# Patient Record
Sex: MALE | Race: WHITE
[De-identification: names, ages, dates, MRNs, and addresses within clinical notes are randomized per-mention and may not be internally consistent; named-entity substitution may affect disease eponyms.]

---

## 2019-03-08 ENCOUNTER — HOSPITAL ENCOUNTER (OUTPATIENT)
Dept: HOSPITAL 62 - RAD | Age: 44
End: 2019-03-08
Attending: NURSE PRACTITIONER
Payer: OTHER GOVERNMENT

## 2019-03-08 DIAGNOSIS — R31.0: ICD-10-CM

## 2019-03-08 DIAGNOSIS — N20.0: Primary | ICD-10-CM

## 2019-03-08 PROCEDURE — 74178 CT ABD&PLV WO CNTR FLWD CNTR: CPT

## 2019-03-08 PROCEDURE — 82565 ASSAY OF CREATININE: CPT

## 2019-03-08 NOTE — RADIOLOGY REPORT (SQ)
EXAM DESCRIPTION:  CT ABD/PELVIS COMBO



COMPLETED DATE/TIME:  3/8/2019 3:26 pm



REASON FOR STUDY:  GROSS HEMATURIA R31.0  GROSS HEMATURIA



COMPARISON:  None.



TECHNIQUE:  CT scan of the abdomen and pelvis performed with and without intravenous contrast, and wi
thout oral contrast. Contrasted imaging performed helical scanning technique and dynamic intravenous 
contrast injection. Images reviewed with lung, soft tissue, and bone windows. Reconstructed coronal a
nd sagittal MPR images reviewed. Delayed images for evaluation of the urinary system also acquired. A
ll images stored on PACS.

All CT scanners at this facility use dose modulation, iterative reconstruction, and/or weight based d
osing when appropriate to reduce radiation dose to as low as reasonably achievable (ALARA).

[facility]: Dose Right  [facility]: CareDose    [facility]: Dose Right    [facility]: Teradose 4D    [facility]: Smart Technologies



CONTRAST TYPE AND DOSE:  contrast/concentration: Isovue 350.00 mg/ml; Total Contrast Delivered: 86.0 
ml; Total Saline Delivered: 69.0 ml



RENAL FUNCTION:  None required. The patient is less than 50 years old.



RADIATION DOSE:  CT Rad equipment meets quality standard of care and radiation dose reduction techniq
ues were employed. CTDIvol: 9.0 - 9.0 mGy. DLP: 1444 mGy-cm..



LIMITATIONS:  None.



FINDINGS:  NON-CONTRASTED IMAGIN mm calcified stone in the left renal lower pole.  No other renal
 or bladder calcifications. No other significant organ calcifications.

POST-CONTRASTED IMAGING:

LOWER CHEST: No significant findings. No nodules or infiltrates.

LIVER: Normal size. No masses.  No dilated ducts.

SPLEEN: Normal size. No focal lesions.

PANCREAS: No masses. No significant calcifications. No adjacent inflammation or peripancreatic fluid 
collections. Pancreatic duct not dilated.

GALLBLADDER: No identified stones by CT criteria. No inflammatory changes to suggest cholecystitis.

ADRENAL GLANDS: No significant masses or asymmetry.

RIGHT KIDNEY AND URETER: No solid masses. No significant calcification. No hydronephrosis or hydroure
ter.

LEFT KIDNEY AND URETER: No solid masses. 2 mm calcified stone in the left renal lower pole.. No hydro
nephrosis or hydroureter.

AORTA AND VESSELS: No aneurysm. No dissection. Renal arteries, SMA, celiac without stenosis.

RETROPERITONEUM: No retroperitoneal adenopathy, hemorrhage or masses.

BOWEL AND PERITONEAL CAVITY: No masses or inflammatory changes. No free fluid or peritoneal masses.

APPENDIX: Surgically absent.

PELVIS: No mass.  No free fluid. Normal bladder.

ABDOMINAL WALL: No masses. No hernias.

BONES: No significant or acute findings.

OTHER: No other significant finding.



IMPRESSION:  2 mm calcified stone in the left renal lower pole.  No acute findings otherwise.



TECHNICAL DOCUMENTATION:  JOB ID:  [id number]

[id number]

Quality ID # [id number]: Final reports with documentation of one or more dose reduction techniques (e.g., Au
tomated exposure control, adjustment of the mA and/or kV according to patient size, use of iterative 
reconstruction technique)

  [employer]- All Rights Reserved



Reading location - IP/workstation name: [email or web address]